# Patient Record
(demographics unavailable — no encounter records)

---

## 2024-12-04 NOTE — ASSESSMENT
[FreeTextEntry1] :  75 year old white female with HTN, hypothyroidism. she has Sjogren's syndrome, for which she has been receiving Plaquenil she has RA with multiple joint involvement she has been on Enbrel,   Patient with 4 prior episodes of diverticulitis in the last 10 years.  Current episode began in September with LLQ discomfort.  She has had 2 complete Augmentin) and 1 incomplete (cipro/flagyl).  None of the courses of treatment gave her a durable remission.  Current relapse started on 10 Nov.  She is clinically improved, but continues to have purulent draiange from the abscess catheter  Discussed with surgeon by phone during the office visit  she is slated for surgery on 16 Dec will continue with IV antibiotics thru the jeanne-operative period in order to contain the infection   treatment plan discussed with the patient and her , who are in agreement  Labs drawn in the office to facilitate diagnostic and treatment plan, as they were not drawn by homecare on 2 Dec as planned.  Plan:  1. CBC, ESR, CRP, CMP  2. continue with meropenem thru the time of the procedure   Total time was 45  minutes This includes time spent before the visit, the actual patient visit, and time spent on documentation after the visit (on the date of service) Activities included in this visit include (but are not limited to): Review of the ID outpatient chart, records of the patient's last hospitalization, records of other [facility] physician's office visits Interpretation of laboratory data, including cultures Review of radiologic studies Counselling the patient as to expectations of future care, including follow-up, need for surgery, antibiotic treatment.    [Treatment Education] : treatment education [Treatment Adherence] : treatment adherence [Disclosure of Diagnosis] : disclosure of diagnosis [Anticipatory Guidance] : anticipatory guidance

## 2024-12-04 NOTE — PHYSICAL EXAM
[General Appearance - Alert] : alert [General Appearance - In No Acute Distress] : in no acute distress [Sclera] : the sclera and conjunctiva were normal [PERRL With Normal Accommodation] : pupils were equal in size, round, reactive to light [Extraocular Movements] : extraocular movements were intact [Outer Ear] : the ears and nose were normal in appearance [Examination Of The Oral Cavity] : the lips and gums were normal [Oropharynx] : the oropharynx was normal with no thrush [Neck Appearance] : the appearance of the neck was normal [Neck Cervical Mass (___cm)] : no neck mass was observed [Respiration, Rhythm And Depth] : normal respiratory rhythm and effort [Exaggerated Use Of Accessory Muscles For Inspiration] : no accessory muscle use [Auscultation Breath Sounds / Voice Sounds] : lungs were clear to auscultation bilaterally [Heart Rate And Rhythm] : heart rate was normal and rhythm regular [Heart Sounds] : normal S1 and S2 [Murmurs] : no murmurs [Bowel Sounds] : normal bowel sounds [Abdomen Soft] : soft [Abdomen Tenderness] : non-tender [Abdomen Mass (___ Cm)] : no abdominal mass palpated [Costovertebral Angle Tenderness] : no CVA tenderness [Supraclavicular Lymph Nodes Enlarged Bilaterally] : supraclavicular [Axillary Lymph Nodes Enlarged Bilaterally] : axillary [Musculoskeletal - Swelling] : no joint swelling [Nail Clubbing] : no clubbing  or cyanosis of the fingernails [] : no rash [FreeTextEntry1] : no erythema or swelling at midline catheter site [Cranial Nerves] : cranial nerves 2-12 were intact [Sensation] : the sensory exam was normal to light touch and pinprick [Oriented To Time, Place, And Person] : oriented to person, place, and time [Affect] : the affect was normal

## 2024-12-04 NOTE — REVIEW OF SYSTEMS
[Fever] : no fever [Chills] : no chills [Body Aches] : no body aches [Eye Pain] : no eye pain [Sore Throat] : no sore throat [Chest Pain] : no chest pain [Palpitations] : no palpitations [Shortness Of Breath] : no shortness of breath [Wheezing] : no wheezing [Cough] : no cough [SOB on Exertion] : no shortness of breath during exertion [Sputum] : not coughing up ~M sputum [Pleuritic Chest Pain] : no pleuritic chest pain [Abdominal Pain] : no abdominal pain [Vomiting] : no vomiting [Constipation] : no constipation [Diarrhea] : no diarrhea [Dysuria] : no dysuria [Joint Pain] : no joint pain [Skin Wound] : no skin wound [Confused] : no confusion [Dizziness] : no dizziness [Anxiety] : no anxiety [Swollen Glands In The Neck] : no swollen glands in the neck [FreeTextEntry7] : drain in situ

## 2024-12-04 NOTE — REASON FOR VISIT
[Post Hospitalization] : a post hospitalization visit [Spouse] : spouse [FreeTextEntry1] : fu from hospital stay ([facility]) for Diverticulitis IV Meropenem 1g q12h with [facility], no bw this week. pt saw Dr. Dumont (Colorectal surgeon) today

## 2024-12-04 NOTE — HISTORY OF PRESENT ILLNESS
[FreeTextEntry1] :  75 year old white female with HTN, hypothyroidism. she has Sjogren's syndrome, for which she has been receiving Plaquenil she has RA with multiple joint involvement she has been on Enbrel, last dose, ~1 month ago.   Patient with 4 prior episodes of diverticulitis in the last 10 years.  Current episode began in September with LLQ discomfort.  She has had 2 complete Augmentin) and 1 incomplete (cipro/flagyl).  None of the courses of treatment gave her a durable remission.  Current relapse started on 10 Nov.   She was admitted to [facility] from 11 Nov to 25 Nov. CT scan showed diverticulitis with a pelvic abscess  she has been treated with meropenem since 11 Nov, #23 antibiotics   she had a plevic drain placed. cultures with an Enterococcus malodaratus, and a Bactereoides ovatus.  Patient denies any fevers, chills, rigors, dysuria.

## 2024-12-04 NOTE — ASSESSMENT
[FreeTextEntry1] :  75 year old white female with HTN, hypothyroidism. she has Sjogren's syndrome, for which she has been receiving Plaquenil she has RA with multiple joint involvement she has been on Enbrel,   Patient with 4 prior episodes of diverticulitis in the last 10 years.  Current episode began in September with LLQ discomfort.  She has had 2 complete Augmentin) and 1 incomplete (cipro/flagyl).  None of the courses of treatment gave her a durable remission.  Current relapse started on 10 Nov.  She is clinically improved, but continues to have purulent draiange from the abscess catheter  Discussed with surgeon by phone during the office visit  she is slated for surgery on 16 Dec will continue with IV antibiotics thru the jeanne-operative period in order to contain the infection   treatment plan discussed with the patient and her , who are in agreement  Labs drawn in the office to facilitate diagnostic and treatment plan, as they were not drawn by homecare on 2 Dec as planned.  Plan:  1. CBC, ESR, CRP, CMP  2. continue with meropenem thru the time of the procedure   Total time was 45  minutes This includes time spent before the visit, the actual patient visit, and time spent on documentation after the visit (on the date of service) Activities included in this visit include (but are not limited to): Review of the ID outpatient chart, records of the patient's last hospitalization, records of other [facility] physician's office visits Interpretation of laboratory data, including cultures Review of radiologic studies Counselling the patient as to expectations of future care, including follow-up, need for surgery, antibiotic treatment.    [Treatment Education] : treatment education [Treatment Adherence] : treatment adherence [Disclosure of Diagnosis] : disclosure of diagnosis [Anticipatory Guidance] : anticipatory guidance

## 2024-12-04 NOTE — REASON FOR VISIT
[Post Hospitalization] : a post hospitalization visit [Spouse] : spouse [FreeTextEntry1] : fu from hospital stay ([facility]) for Diverticulitis IV Meropenem 1g q12h with [facility], no bw this week. pt saw Dr. Dumont (Colorectal surgeon) today

## 2024-12-04 NOTE — HISTORY OF PRESENT ILLNESS
[FreeTextEntry1] :  75 year old white female with HTN, hypothyroidism. she has Sjogren's syndrome, for which she has been receiving Plaquenil she has RA with multiple joint involvement she has been on Enbrel, last dose, ~1 month ago.   Patient with 4 prior episodes of diverticulitis in the last 10 years.  Current episode began in September with LLQ discomfort.  She has had 2 complete Augmentin) and 1 incomplete (cipro/flagyl).  None of the courses of treatment gave her a durable remission.  Current relapse started on 10 Nov.   She was admitted to [facility] from 11 Nov to 25 Nov. CT scan showed diverticulitis with a pelvic abscess  she has been treated with meropenem since 11 Nov, #23 antibiotics   she had a plevic drain placed. cultures with an Enterococcus malodaratus, and a Bactereoides ovatus.  Patient denies any fevers, chills, rigors, dysuria.

## 2025-05-09 NOTE — DISCUSSION/SUMMARY
[de-identified] : (Impression) -right shoulder glenohumeral osteoarthritis -right proximal humerus cartilage tumor  The diagnosis was explained in detail. The potential non-surgical and surgical treatments were reviewed. The relative risks and benefits of each option were considered relative to the patient age, activity level, medical history, symptom severity and previously attempted treatments.  The patient was advised to consult with their primary medical provider prior to initiation of any new medications to reduce the risk of adverse effects specific to their long-term home medications and medical history. The risk of gastrointestinal irritation and kidney injury specific to long-term NSAID use was discussed.   (Plan) -Recommend re-evaluation of her cartilage tumor by orthopedic oncology specialist, given the large size and cortical compromise. Contact information given for Dr. Logan Sanders at [place]. Patient expressed interest in returning to [facility].  -Discussed that the primary cause of her pain is her advanced glenohumeral osteoarthritis.  -Discussed that cortisone injection for pain relief is not recommended until her tumor is further evaluated.  -Discussed that RSA is not recommended at this time. She would likely need tumor resection concomitantly with placement of diaphyseal based stem, given her limited proximal humerus bone stock.  -Over the counter acetaminophen for pain, take as needed. -Follow up as needed.    (MDM) -Problem Complexity -Moderate: chronic illness with exacerbation   Risk -Low: over the counter medication   Visit Type -Established

## 2025-05-09 NOTE — HISTORY OF PRESENT ILLNESS
[de-identified] : Date of initial evaluation: 05/09/2025 Patient age: 75 year Body part causing symptoms: right shoulder Location of the pain: anterior  Pain score today: 10/10 Duration of symptoms: one month History of injury: felt pain while gardening  Activities that worsen the pain: exercise, sleep, pulling, reaching out  Radicular symptoms: none  Medications attempted for this problem: Prednisone, Tylenol, Lidocaine patch   PT for this problem: none Injections for this problem: none Previous surgery on this body part: none Prior imaging: MRI at   Occupation: retired  Reports history of RA and PMR She was seen 4 years ago for shoulder cyst at [facility] and told no further treatment necessary. denies constitutional symptoms

## 2025-05-09 NOTE — IMAGING
[de-identified] : (Exam: Shoulder)   Laterality is right left   Patient is in no acute distress, alert and oriented Sensation is grossly intact to light touch in the hand Motor function is 5/5 in the hand Capillary refill is less than 2 seconds in the fingers Lymphadenopathy is not present Peripheral edema is not present    Skin is intact Swelling is not present Atrophy is not present Scapular winging is not present Deformity of the AC joint is not present Deformity of the biceps is not present   Bicipital groove tenderness is present AC joint tenderness is not present Scapulothoracic tenderness is not present   Active forward elevation is 140 Passive forward elevation is 160 External rotation at the side is 60 Internal rotation behind the back is to the level of T12   Forward elevation strength is 4/5 External rotation strength at the side is 4/5 Internal rotation strength at the side is 5/5 Deltoid strength of anterior, posterior and lateral heads is 5/5   Aguilar test is abnormal OBriens test is abnormal Empty can test is abnormal Speeds test is abnormal Cross body adduction test is normal Belly press test is normal Apprehension and relocation is normal Sulcus sign is normal

## 2025-05-09 NOTE — DATA REVIEWED
[MRI] : MRI [Right] : of the right [Shoulder] : shoulder [I independently reviewed and interpreted images and report] : I independently reviewed and interpreted images and report [FreeTextEntry1] : large cartilage lesion proximal humeral metadiaphysis with significant thinning lateral cortex, no discrete soft tissue effect. there is advanced GH OA with partial tearing rotator cuff